# Patient Record
Sex: FEMALE | Race: WHITE | NOT HISPANIC OR LATINO | Employment: FULL TIME | URBAN - METROPOLITAN AREA
[De-identification: names, ages, dates, MRNs, and addresses within clinical notes are randomized per-mention and may not be internally consistent; named-entity substitution may affect disease eponyms.]

---

## 2019-02-10 ENCOUNTER — HOSPITAL ENCOUNTER (EMERGENCY)
Facility: MEDICAL CENTER | Age: 29
End: 2019-02-10
Attending: EMERGENCY MEDICINE

## 2019-02-10 ENCOUNTER — APPOINTMENT (OUTPATIENT)
Dept: RADIOLOGY | Facility: MEDICAL CENTER | Age: 29
End: 2019-02-10
Attending: EMERGENCY MEDICINE

## 2019-02-10 VITALS
DIASTOLIC BLOOD PRESSURE: 69 MMHG | HEART RATE: 87 BPM | HEIGHT: 69 IN | OXYGEN SATURATION: 100 % | BODY MASS INDEX: 17.03 KG/M2 | RESPIRATION RATE: 18 BRPM | TEMPERATURE: 98.2 F | SYSTOLIC BLOOD PRESSURE: 121 MMHG | WEIGHT: 115 LBS

## 2019-02-10 DIAGNOSIS — S92.414A CLOSED NONDISPLACED FRACTURE OF PROXIMAL PHALANX OF RIGHT GREAT TOE, INITIAL ENCOUNTER: ICD-10-CM

## 2019-02-10 PROCEDURE — 73660 X-RAY EXAM OF TOE(S): CPT | Mod: RT

## 2019-02-10 PROCEDURE — 99284 EMERGENCY DEPT VISIT MOD MDM: CPT

## 2019-02-10 RX ORDER — IBUPROFEN 800 MG/1
800 TABLET ORAL EVERY 8 HOURS PRN
Qty: 30 TAB | Refills: 0 | Status: SHIPPED | OUTPATIENT
Start: 2019-02-10 | End: 2019-02-15

## 2019-02-10 ASSESSMENT — LIFESTYLE VARIABLES
HAVE PEOPLE ANNOYED YOU BY CRITICIZING YOUR DRINKING: NO
EVER HAD A DRINK FIRST THING IN THE MORNING TO STEADY YOUR NERVES TO GET RID OF A HANGOVER: NO
TOTAL SCORE: 0
DO YOU DRINK ALCOHOL: YES
TOTAL SCORE: 0
EVER FELT BAD OR GUILTY ABOUT YOUR DRINKING: NO
TOTAL SCORE: 0
CONSUMPTION TOTAL: INCOMPLETE
HAVE YOU EVER FELT YOU SHOULD CUT DOWN ON YOUR DRINKING: NO

## 2019-02-11 NOTE — ED PROVIDER NOTES
"ED Provider Note      ER PROVIDER NOTE      CHIEF COMPLAINT  Chief Complaint   Patient presents with   • Toe Pain     stubbed toe       HPI  Andreea Edmonds is a 28 y.o. female who presents to the emergency department complaining of right great toe pain.  Patient reports around noon she stubbed her toe while in her hotel room, has had pain to that area since.  She denies any ankle pain or other foot pain.  Denies any other injury.  States some tingling to her toe otherwise no focal weakness numbness or tingling    REVIEW OF SYSTEMS  Pertinent positives include toe pain. Pertinent negatives include no weakness or numbness. See HPI for details. All other systems reviewed and are negative.    PAST MEDICAL HISTORY   has a past medical history of Trigeminal neuralgia.    SOCIAL HISTORY  Social History   Substance Use Topics   • Smoking status: Never Smoker   • Smokeless tobacco: Never Used   • Alcohol use Yes      Comment: occasionally       SURGICAL HISTORY   has a past surgical history that includes tonsillectomy and cyst excision.    CURRENT MEDICATIONS  Home Medications    **Home medications have not yet been reviewed for this encounter**         ALLERGIES  No Known Allergies    PHYSICAL EXAM  VITAL SIGNS: /86   Pulse 82   Temp 36.8 °C (98.2 °F) (Temporal)   Resp 16   Ht 1.753 m (5' 9\")   Wt 52.2 kg (115 lb)   SpO2 100%   BMI 16.98 kg/m²   Pulse ox interpretation: I interpret this pulse ox as normal.    Constitutional: Alert.  In no apparent distress.  HENT: Normocephalic, Atraumatic, Bilateral external ears normal. Nose normal.   Eyes: Pupils are equal and reactive. Conjunctiva normal, non-icteric.   Heart: Regular rate and rhythm, no murmurs.    Lungs: Clear to auscultation bilaterally.  Skin: Warm, Dry, No erythema, No rash.   Musculoskeletal: Tenderness to palpation over right great toe, primarily at metatarsal phalangeal joint with some associated bruising and swelling, no obvious deformity, " distal capillary refill less than 2 seconds, distal sensation intact light touch, no tenderness throughout the midfoot and hindfoot or ankle, otherwise no tenderness or major deformities noted. No edema.  Neurologic: Alert, Grossly non-focal.   Psychiatric: Affect normal, Judgment normal, Mood normal, Appears appropriate and not intoxicated.     DIAGNOSTIC STUDIES / PROCEDURES        RADIOLOGY  DX-TOE(S) 2+ RIGHT   Final Result      Fracture involving the base of the first proximal phalanx.        The radiologist's interpretation of all radiological studies have been reviewed by me.    COURSE & MEDICAL DECISION MAKING  Nursing notes, VS, PMSFHx reviewed in chart.    6:16 PM - Patient seen and examined at bedside.Ordered for x-rays to evaluate her symptoms.  She declined pain medication    6:52 PM  Patient reevaluated, updated on results, plan for [place] shoe, follow-up with her doctor in [place] for recheck        Decision Making:  This is a 28 y.o. female presented with toe pain after stubbing her toe.  She does have a proximal phalanx fracture, slight crackle no displacement.  She will placed in a hard sole shoe, follow-up with her doctor in [place] for recheck.  Will prescribe ibuprofen as needed for pain.  There is no evidence of other fracture or more proximal foot injury or neurovascular compromise    The patient will return for new or worsening symptoms and is stable at the time of discharge.    The patient is referred to a primary physician for blood pressure management, diabetic screening, and for all other preventative health concerns.    DISPOSITION:  Patient will be discharged home in stable condition.    FOLLOW UP:  Your doctor in [place]    In 1 week        OUTPATIENT MEDICATIONS:  New Prescriptions    IBUPROFEN (MOTRIN) 800 MG TAB    Take 1 Tab by mouth every 8 hours as needed for Moderate Pain for up to 5 days.         FINAL IMPRESSION  1. Closed nondisplaced fracture of proximal phalanx  of right great toe, initial encounter      The note accurately reflects work and decisions made by me.  Abdifatah Abarca  2/10/2019  6:55 PM

## 2019-02-11 NOTE — ED TRIAGE NOTES
Chief Complaint   Patient presents with   • Toe Pain     stubbed toe     Stubbed toe on chair.  Bruising and edema of right big toe.  Triage process explained to patient.  Pt back to waiting room.  Pt instructed to inform RN if any changes or questions arise.

## 2019-02-11 NOTE — ED NOTES
Patient verbalizes understanding of discharge instructions, follow up and med management. Steady gait out of ED.  Rx provided.